# Patient Record
Sex: FEMALE | Race: WHITE | Employment: FULL TIME | URBAN - METROPOLITAN AREA
[De-identification: names, ages, dates, MRNs, and addresses within clinical notes are randomized per-mention and may not be internally consistent; named-entity substitution may affect disease eponyms.]

---

## 2017-02-02 PROCEDURE — 88175 CYTOPATH C/V AUTO FLUID REDO: CPT | Performed by: OBSTETRICS & GYNECOLOGY

## 2017-03-07 PROCEDURE — 80074 ACUTE HEPATITIS PANEL: CPT | Performed by: INTERNAL MEDICINE

## 2023-03-05 ENCOUNTER — APPOINTMENT (OUTPATIENT)
Dept: GENERAL RADIOLOGY | Facility: HOSPITAL | Age: 58
End: 2023-03-05
Attending: EMERGENCY MEDICINE
Payer: OTHER MISCELLANEOUS

## 2023-03-05 ENCOUNTER — HOSPITAL ENCOUNTER (EMERGENCY)
Facility: HOSPITAL | Age: 58
Discharge: HOME | End: 2023-03-05
Attending: EMERGENCY MEDICINE
Payer: OTHER MISCELLANEOUS

## 2023-03-05 VITALS
TEMPERATURE: 99 F | RESPIRATION RATE: 18 BRPM | SYSTOLIC BLOOD PRESSURE: 145 MMHG | HEART RATE: 74 BPM | DIASTOLIC BLOOD PRESSURE: 74 MMHG | OXYGEN SATURATION: 96 %

## 2023-03-05 DIAGNOSIS — S80.01XA CONTUSION OF RIGHT KNEE, INITIAL ENCOUNTER: ICD-10-CM

## 2023-03-05 DIAGNOSIS — S40.011A CONTUSION OF RIGHT SHOULDER, INITIAL ENCOUNTER: Primary | ICD-10-CM

## 2023-03-05 PROCEDURE — 73030 X-RAY EXAM OF SHOULDER: CPT | Performed by: EMERGENCY MEDICINE

## 2023-03-05 PROCEDURE — 73562 X-RAY EXAM OF KNEE 3: CPT | Performed by: EMERGENCY MEDICINE

## 2023-03-05 PROCEDURE — 99284 EMERGENCY DEPT VISIT MOD MDM: CPT

## 2023-03-05 RX ORDER — NAPROXEN 500 MG/1
500 TABLET ORAL 2 TIMES DAILY PRN
Qty: 20 TABLET | Refills: 0 | Status: SHIPPED | OUTPATIENT
Start: 2023-03-05 | End: 2023-03-15

## 2023-03-05 RX ORDER — HYDROCODONE BITARTRATE AND ACETAMINOPHEN 5; 325 MG/1; MG/1
1 TABLET ORAL ONCE
Status: COMPLETED | OUTPATIENT
Start: 2023-03-05 | End: 2023-03-05

## 2023-03-05 RX ORDER — NAPROXEN 250 MG/1
500 TABLET ORAL ONCE
Status: COMPLETED | OUTPATIENT
Start: 2023-03-05 | End: 2023-03-05

## 2023-03-05 NOTE — ED PROVIDER NOTES
Patient Seen in: [facility] Emergency Department      History   Patient presents with:  Contusion    Stated Complaint: shoulder inj after fall     Subjective:   HPI    49-year-old female presents with right shoulder and knee pain after she fell while dog walking early this morning. Patient states she slipped on some ice which caused her to lose her balance and landed directly onto her right shoulder. Denies head injury or LOC. Has been able to ambulate without difficulty. Objective:   No pertinent past medical history. No pertinent past surgical history. No pertinent social history. Review of Systems   Musculoskeletal: Positive for arthralgias. Neurological: Negative for dizziness, syncope and headaches. Positive for stated complaint: shoulder inj after fall   Other systems are as noted in HPI. Constitutional and vital signs reviewed. All other systems reviewed and negative except as noted above. Physical Exam     ED Triage Vitals   BP    Pulse    Resp    Temp    Temp src    SpO2    O2 Device        Current:There were no vitals taken for this visit. Physical Exam  Vitals and nursing note reviewed. Constitutional:       Appearance: She is well-developed. HENT:      Head: Normocephalic and atraumatic. Mouth/Throat:      Mouth: Mucous membranes are moist.   Eyes:      General: No scleral icterus. Musculoskeletal:      Comments: Right shoulder with tenderness laterally  No obvious deformity or loss of rounded contour    Right elbow and wrist with good ROM  Strong right radial pulse  Sensory intact    Right knee with tenderness along the lateral aspect  Good ROM   Skin:     General: Skin is warm and dry. Neurological:      General: No focal deficit present. Mental Status: She is alert and oriented to person, place, and time. Cranial Nerves: No cranial nerve deficit. Motor: No weakness.    Psychiatric:         Mood and Affect: Mood normal.         Behavior: Behavior normal.               ED Course   Labs Reviewed - No data to display       XR SHOULDER, COMPLETE (MIN 2 VIEWS), RIGHT (CPT=73030)    Result Date: 3/5/2023  CONCLUSION:  No acute fracture or dislocation. Dictated by (CST): Sylvia Fowler MD on 3/05/2023 at 6:00 PM     Finalized by (CST): Sylvia Fowler MD on 3/05/2023 at 6:01 PM       XR KNEE (3 VIEWS), RIGHT (GKS=65434)    Result Date: 3/5/2023  CONCLUSION:  No acute fracture or dislocation. Dictated by (CST): Sylvia Fowler MD on 3/05/2023 at 5:58 PM     Finalized by (CST): Sylvia Fowler MD on 3/05/2023 at 6:00 PM                MDM   75-year-old female presents with right shoulder and knee pain after she fell while dog walking early this morning. Differential includes but is not limited to shoulder contusion, shoulder fracture, shoulder dislocation,, knee contusion, knee fracture. Will check x-rays to evaluate further. X-rays interpreted independently and showed no evidence of fracture. Radiology reports reviewed as above. Sling provided for comfort. Instructed to ice and elevate. Will give Rx for naproxen. Instructed to follow-up with PCP or Ortho for further outpatient management.   Return precaution discussed      MDM    Disposition and Plan     Clinical Impression:  Contusion of right shoulder, initial encounter  (primary encounter diagnosis)  Contusion of right knee, initial encounter     Disposition:  Discharge  3/5/2023  6:16 pm    Follow-up:  Stefani Roche MD  [address] [facility] [address], [address] [id number]    Schedule an appointment as soon as possible for a visit      [place], [address] [facility] [address]  [phone]    Schedule an appointment as soon as possible for a visit            Medications Prescribed:  Current Discharge Medication List    START taking these medications    naproxen 500 MG Oral Tab  Take 1 tablet (500 mg total) by mouth 2 (two) times daily as needed.   Qty: 20 tablet Refills: 0